# Patient Record
Sex: MALE | Race: WHITE
[De-identification: names, ages, dates, MRNs, and addresses within clinical notes are randomized per-mention and may not be internally consistent; named-entity substitution may affect disease eponyms.]

---

## 2019-02-26 ENCOUNTER — RECORD ABSTRACTING (OUTPATIENT)
Age: 24
End: 2019-02-26

## 2019-03-05 ENCOUNTER — APPOINTMENT (OUTPATIENT)
Dept: OTOLARYNGOLOGY | Facility: CLINIC | Age: 24
End: 2019-03-05

## 2019-03-12 ENCOUNTER — APPOINTMENT (OUTPATIENT)
Dept: OTOLARYNGOLOGY | Facility: CLINIC | Age: 24
End: 2019-03-12
Payer: COMMERCIAL

## 2019-03-12 VITALS
WEIGHT: 180 LBS | HEART RATE: 81 BPM | BODY MASS INDEX: 20.83 KG/M2 | SYSTOLIC BLOOD PRESSURE: 154 MMHG | DIASTOLIC BLOOD PRESSURE: 106 MMHG | OXYGEN SATURATION: 98 % | HEIGHT: 78 IN

## 2019-03-12 DIAGNOSIS — J32.1 CHRONIC FRONTAL SINUSITIS: ICD-10-CM

## 2019-03-12 DIAGNOSIS — Z78.9 OTHER SPECIFIED HEALTH STATUS: ICD-10-CM

## 2019-03-12 DIAGNOSIS — J31.0 CHRONIC RHINITIS: ICD-10-CM

## 2019-03-12 DIAGNOSIS — Z91.89 OTHER SPECIFIED PERSONAL RISK FACTORS, NOT ELSEWHERE CLASSIFIED: ICD-10-CM

## 2019-03-12 PROCEDURE — 31231 NASAL ENDOSCOPY DX: CPT

## 2019-03-12 PROCEDURE — 99243 OFF/OP CNSLTJ NEW/EST LOW 30: CPT | Mod: 25

## 2019-03-12 RX ORDER — PREDNISONE 10 MG/1
10 TABLET ORAL
Qty: 12 | Refills: 0 | Status: ACTIVE | COMMUNITY
Start: 2019-03-12 | End: 1900-01-01

## 2019-03-12 RX ORDER — FLUTICASONE PROPIONATE 50 UG/1
50 SPRAY, METERED NASAL DAILY
Qty: 1 | Refills: 3 | Status: ACTIVE | COMMUNITY
Start: 2019-03-12 | End: 1900-01-01

## 2019-03-12 RX ORDER — SULFAMETHOXAZOLE AND TRIMETHOPRIM 800; 160 MG/1; MG/1
800-160 TABLET ORAL TWICE DAILY
Qty: 20 | Refills: 0 | Status: ACTIVE | COMMUNITY
Start: 2019-03-12 | End: 1900-01-01

## 2019-03-12 NOTE — HISTORY OF PRESENT ILLNESS
[de-identified] : DANE POLANCO is a 24 year  patient seen in consultation for a  2 month history of left frontal headaches and lack of focus. He was referred by Dr. Farias.  His symptoms started after he developed scombroid from eating fish.  He also had a rash, dizziness, palpitations along with the headaches.  he was treated with an antihistamine. He had an MRI of the brain on 2/6/19 which showed severe opacification of the left frontal sinus.  He has mild nasal congestion and obstruction.  He will be scheduling an appointment with the allergist. He has not been on antibiotics.  He had headaches when flying recently too. \par \par Sinus medications: was on antihistamine- no change in nasal symptoms. no nasal steroid spray, nasal saline rinse or antibiotic\par \par History of frequent or recurrent sinus infections: no\par \par Allergy testing:  no\par Nasal/sinus surgery: no\par Nasal trauma: no\par Reflux or throat symptoms:  no\par \par Pets at home: no\par Secondhand smoke exposure: no

## 2019-03-12 NOTE — CONSULT LETTER
[Dear  ___] : Dear  [unfilled], [Consult Letter:] : I had the pleasure of evaluating your patient, [unfilled]. [Consult Closing:] : Thank you very much for allowing me to participate in the care of this patient.  If you have any questions, please do not hesitate to contact me. [Sincerely,] : Sincerely, [FreeTextEntry3] : Kami Aaron MD\par

## 2019-03-12 NOTE — ASSESSMENT
[FreeTextEntry1] : He has a history of headaches and a suspicion of Vicryl for the past 2 months. His symptoms started around the time that he had food poisoning. He was found to have left frontal sinus opacification on MRI. He has not been on medication for the sinuses.\par \par PLAN\par \par -findings and management options discussed in detail with the patient. \par -Nasal saline rinses, nasal steroid spray (such as fluticasone), antihistamine/decongestant as needed\par -He will be placed on a course of bactrim and steroids for an infection. He was asked to review the associated risks of steroids.  There is a risk of fractures, mood changes and GI complications.\par -Allergy evaluation pending\par -CT scan of the sinuses once he has completed the medication. We will see if the sinus inflammation has resolved. if it has not, we will discuss further management. \par -follow up after the CT scan\par -call and return earlier if any concerns. \par

## 2019-04-04 ENCOUNTER — APPOINTMENT (OUTPATIENT)
Dept: CT IMAGING | Facility: HOSPITAL | Age: 24
End: 2019-04-04